# Patient Record
Sex: FEMALE | Race: WHITE | NOT HISPANIC OR LATINO | Employment: UNEMPLOYED | URBAN - METROPOLITAN AREA
[De-identification: names, ages, dates, MRNs, and addresses within clinical notes are randomized per-mention and may not be internally consistent; named-entity substitution may affect disease eponyms.]

---

## 2020-03-06 ENCOUNTER — TELEPHONE (OUTPATIENT)
Dept: FAMILY MEDICINE | Facility: CLINIC | Age: 25
End: 2020-03-06

## 2020-03-06 NOTE — TELEPHONE ENCOUNTER
Patient called stating she is currently having chest pains and SOB occasionally over the past few days, patient requested next avail appt which isnt until Monday. Advised patient to go to to the emergency room for further evaluation

## 2020-03-06 NOTE — TELEPHONE ENCOUNTER
"----- Message from Fer Caputo sent at 3/6/2020  1:58 PM CST -----  Contact: Pt   Type: Needs Medical Advice    Who Called: Patient   Sysptoms (please be specific): Cough,shortness of breath and flu like "Red Dot"  How long has patient had these symptoms: few weeks   Pharmacy name and phone #:  [phone]  Would the patient rather a call back or a response via [facility]? Call back   Best Call Back Number : [phone]  Additional Information: N/a    Thank You    "

## 2021-04-29 ENCOUNTER — PATIENT MESSAGE (OUTPATIENT)
Dept: RESEARCH | Facility: HOSPITAL | Age: 26
End: 2021-04-29

## 2022-08-04 ENCOUNTER — TELEPHONE (OUTPATIENT)
Dept: OBSTETRICS AND GYNECOLOGY | Facility: CLINIC | Age: 27
End: 2022-08-04
Payer: MEDICAID

## 2022-08-04 NOTE — TELEPHONE ENCOUNTER
Informed patient that Dr. Harman is not accepting new medicaid patients at this moment. Did offer next available appointment with Dr. Porras. Patient denied.    ----- Message from Lyndsey Khanna Patient Care Assistant sent at 8/4/2022  1:53 PM CDT -----  Regarding: appointment  Contact: pt  Type:  Sooner Appointment Request    Caller is requesting a sooner appointment.  Caller declined first available appointment listed below.  Caller will not accept being placed on the waitlist and is requesting a message be sent to doctor.    Name of Caller:  pt   When is the first available appointment?  8/19/22  Symptoms:  vaginal lips swollen   Best Call Back Number:  [phone] (home)     Additional Information:  please call pt to advise. Thanks!         ptn, renal, heme, ID, GI, pulm, NP, ED

## 2023-01-09 ENCOUNTER — TELEPHONE (OUTPATIENT)
Dept: FAMILY MEDICINE | Facility: CLINIC | Age: 28
End: 2023-01-09
Payer: MEDICAID

## 2023-01-09 NOTE — TELEPHONE ENCOUNTER
Called pt, no answer, unable to leave a voicemail informing pt that we are not accepting new medicaid pts.

## 2023-01-09 NOTE — TELEPHONE ENCOUNTER
----- Message from Nichole Gunn sent at 1/9/2023  4:49 PM CST -----  Contact: pt  Type:  Sooner Appointment Request    Caller is requesting a sooner appointment.  Caller declined first available appointment listed below.  Caller will not accept being placed on the waitlist and is requesting a message be sent to doctor.    Name of Caller:  pt   When is the first available appointment?  N/a   Symptoms:  est care   Best Call Back Number:  [phone]    Additional Information:  pt would like to est care if possible. Please advise.